# Patient Record
Sex: FEMALE | URBAN - METROPOLITAN AREA
[De-identification: names, ages, dates, MRNs, and addresses within clinical notes are randomized per-mention and may not be internally consistent; named-entity substitution may affect disease eponyms.]

---

## 2023-02-10 ENCOUNTER — OFFICE VISIT (OUTPATIENT)
Dept: URBAN - METROPOLITAN AREA CLINIC 115 | Facility: CLINIC | Age: 23
End: 2023-02-10
Payer: COMMERCIAL

## 2023-02-10 ENCOUNTER — LAB OUTSIDE AN ENCOUNTER (OUTPATIENT)
Dept: URBAN - METROPOLITAN AREA CLINIC 115 | Facility: CLINIC | Age: 23
End: 2023-02-10

## 2023-02-10 ENCOUNTER — DASHBOARD ENCOUNTERS (OUTPATIENT)
Age: 23
End: 2023-02-10

## 2023-02-10 VITALS
SYSTOLIC BLOOD PRESSURE: 110 MMHG | TEMPERATURE: 99 F | DIASTOLIC BLOOD PRESSURE: 69 MMHG | HEART RATE: 62 BPM | BODY MASS INDEX: 24.48 KG/M2 | WEIGHT: 133 LBS | HEIGHT: 62 IN

## 2023-02-10 DIAGNOSIS — R10.13 EPIGASTRIC ABDOMINAL PAIN: ICD-10-CM

## 2023-02-10 DIAGNOSIS — R79.89 ABNORMAL LFTS: ICD-10-CM

## 2023-02-10 PROBLEM — 79922009: Status: ACTIVE | Noted: 2023-02-10

## 2023-02-10 PROCEDURE — 99203 OFFICE O/P NEW LOW 30 MIN: CPT | Performed by: INTERNAL MEDICINE

## 2023-02-10 RX ORDER — PANTOPRAZOLE SODIUM 40 MG/1
1 TABLET TABLET, DELAYED RELEASE ORAL TWICE A DAY
Status: ACTIVE | COMMUNITY

## 2023-02-10 RX ORDER — HYOSCYAMINE SULFATE 0.12 MG/1
1 TABLET UNDER THE TONGUE AND ALLOW TO DISSOLVE  AS NEEDED FOR CRAMPING PAIN TABLET, ORALLY DISINTEGRATING ORAL THREE TIMES A DAY
Qty: 60 | Refills: 1 | OUTPATIENT
Start: 2023-02-10 | End: 2023-04-11

## 2023-02-10 NOTE — HPI-TODAY'S VISIT:
Ms. Tam is a 22-year-old female came into the office for follow-up after recent hospital visit.  Patient stated she has been having epigastric abdominal pain for the past 5 to 6 weeks.  Pain is intermittent and sometimes lasts for 30 minutes to an hour.  Its mostly in the symptoms at onset and nighttime.  Sometimes pain radiates to the right upper quadrant and also to the upper back and sometimes even into the retrosternal area.  Patient went to the PCP who sent her to the ER.  Patient stated she has been trying to lose weight for the past year by doing portion control.  She also has history of anxiety and depression.  Patient stated she was on Effexor 5 months ago and she discontinued it.  She also stated that she was vaping which she discontinued it recently tested 10 days ago.  In the ER she was noted to have ALT of 363 AST was 68 normal bili and alkaline phosphatase.  Unremarkable CT scan of the abdomen.  Patient was discharged home on PPI once a day.  Patient reports having this intermittent pain that is ongoing.  Family history of gallstones.  Patient denies drinking alcohol or using any other drugs.  Denies any rectal bleeding or melanotic stools.

## 2023-02-13 ENCOUNTER — WEB ENCOUNTER (OUTPATIENT)
Dept: URBAN - METROPOLITAN AREA CLINIC 115 | Facility: CLINIC | Age: 23
End: 2023-02-13

## 2023-02-15 LAB
(TTG) AB, IGA: <1
(TTG) AB, IGG: <1
A/G RATIO: 2.1
ALBUMIN: 4.6
ALKALINE PHOSPHATASE: 91
ALT (SGPT): 75
ANA SCREEN, IFA: POSITIVE
ANTIGLIADIN ABS, IGA: <1
AST (SGOT): 17
BILIRUBIN, TOTAL: 0.5
BUN/CREATININE RATIO: (no result)
BUN: 7
CALCIUM: 9.9
CARBON DIOXIDE, TOTAL: 27
CHLORIDE: 103
CREATININE: 0.79
EGFR: 108
GLIADIN (DEAMIDATED) AB (IGA): <1
GLIADIN (DEAMIDATED) AB (IGG): <1
GLOBULIN, TOTAL: 2.2
GLUCOSE: 89
HEPATITIS A AB, TOTAL: REACTIVE
HEPATITIS B SURFACE AB IMMUNITY, QN: <5
HEPATITIS B SURFACE ANTIGEN: (no result)
HEPATITIS C ANTIBODY: (no result)
IMMUNOGLOBULIN A: 138
INDEX: <0.02
INTERPRETATION: (no result)
MITOCHONDRIAL (M2) ANTIBODY: <=20
POTASSIUM: 4
PROTEIN, TOTAL: 6.8
RHEUMATOID FACTOR: <14
SJOGREN'S ANTIBODY (SS-A): (no result)
SJOGREN'S ANTIBODY (SS-B): (no result)
SODIUM: 139
T-TRANSGLUTAMINASE (TTG) IGA: <1

## 2023-03-10 ENCOUNTER — ERX REFILL RESPONSE (OUTPATIENT)
Dept: URBAN - METROPOLITAN AREA CLINIC 82 | Facility: CLINIC | Age: 23
End: 2023-03-10

## 2023-03-10 RX ORDER — HYOSCYAMINE SULFATE 0.12 MG/1
TAKE 1 TABLET UNDER THE TONGUE AND ALLOW TO DISSOLVE AS NEEDED FOR CRAMPING, TAKE 3 TIMES DAILY TABLET ORAL; SUBLINGUAL
Qty: 60 TABLET | Refills: 1 | OUTPATIENT

## 2023-03-10 RX ORDER — HYOSCYAMINE SULFATE 0.12 MG/1
TAKE 1 TABLET UNDER THE TONGUE AND ALLOW TO DISSOLVE AS NEEDED FOR CRAMPING, TAKE 3 TIMES DAILY TABLET ORAL; SUBLINGUAL
Qty: 60 TABLET | Refills: 2 | OUTPATIENT